# Patient Record
Sex: FEMALE | Race: WHITE | URBAN - METROPOLITAN AREA
[De-identification: names, ages, dates, MRNs, and addresses within clinical notes are randomized per-mention and may not be internally consistent; named-entity substitution may affect disease eponyms.]

---

## 2018-05-03 ENCOUNTER — TRANSFERRED RECORDS (OUTPATIENT)
Dept: HEALTH INFORMATION MANAGEMENT | Facility: CLINIC | Age: 83
End: 2018-05-03

## 2018-05-31 ENCOUNTER — OFFICE VISIT (OUTPATIENT)
Dept: OTOLARYNGOLOGY | Facility: CLINIC | Age: 83
End: 2018-05-31
Payer: MEDICARE

## 2018-05-31 ENCOUNTER — TRANSFERRED RECORDS (OUTPATIENT)
Dept: HEALTH INFORMATION MANAGEMENT | Facility: CLINIC | Age: 83
End: 2018-05-31

## 2018-05-31 ENCOUNTER — TELEPHONE (OUTPATIENT)
Dept: OTOLARYNGOLOGY | Facility: CLINIC | Age: 83
End: 2018-05-31

## 2018-05-31 DIAGNOSIS — M95.0 MOHS DEFECT OF NASAL ALA: Primary | ICD-10-CM

## 2018-05-31 DIAGNOSIS — Z98.890 MOHS DEFECT OF NASAL ALA: Primary | ICD-10-CM

## 2018-05-31 PROCEDURE — 99205 OFFICE O/P NEW HI 60 MIN: CPT | Performed by: OTOLARYNGOLOGY

## 2018-05-31 RX ORDER — ERGOCALCIFEROL 1.25 MG/1
CAPSULE, LIQUID FILLED ORAL
Refills: 0 | COMMUNITY
Start: 2018-05-11

## 2018-05-31 RX ORDER — LOSARTAN POTASSIUM AND HYDROCHLOROTHIAZIDE 25; 100 MG/1; MG/1
TABLET ORAL
Refills: 3 | COMMUNITY
Start: 2018-03-13

## 2018-05-31 RX ORDER — FLUTICASONE PROPIONATE 50 MCG
SPRAY, SUSPENSION (ML) NASAL
Refills: 2 | COMMUNITY
Start: 2018-04-10

## 2018-05-31 RX ORDER — HYDROCODONE BITARTRATE AND ACETAMINOPHEN 5; 325 MG/1; MG/1
TABLET ORAL
Refills: 0 | COMMUNITY
Start: 2018-04-24

## 2018-05-31 RX ORDER — ATENOLOL 50 MG/1
TABLET ORAL
Refills: 3 | COMMUNITY
Start: 2018-04-24

## 2018-05-31 RX ORDER — IBUPROFEN 800 MG/1
TABLET ORAL
Refills: 3 | COMMUNITY
Start: 2018-05-03

## 2018-05-31 RX ORDER — ROPINIROLE 1 MG/1
TABLET, FILM COATED ORAL
Refills: 1 | COMMUNITY
Start: 2018-05-03

## 2018-05-31 RX ORDER — CHOLECALCIFEROL (VITAMIN D3) 25 MCG
TABLET,CHEWABLE ORAL
Refills: 3 | COMMUNITY
Start: 2018-05-11

## 2018-05-31 ASSESSMENT — PAIN SCALES - GENERAL: PAINLEVEL: NO PAIN (0)

## 2018-05-31 NOTE — PROGRESS NOTES
Joel Staton MD    Dear Doctor Shemar,    Thank you for asking me to see your patient, Ms. Isabell Carias, in consultation to evaluate her nasal ala defect after Mohs surgery.  Today I had the pleasure of seeing her at the Facial Plastic and Reconstructive Surgery Clinic in the Department of Otolaryngology at [facility].    CLINICAL SUMMARY:   Diagnoses:   Right nasal alar defect from basal cell carcinoma, s/p Mohs surgery by Dr. Staton.     Comorbidities: CPAP, sleep apnea  Pertinent medications: ASA 81. ibuprofen  Photographs: [facility] consents signed May 31, 2018.   Other: Daughter = Padmini.   Care Checklist:   _Schedule for stage 1 reconstruction with melolabial flap and conchal cartilage alar batten graft under general anesthesia.  _We may need to hold CPAP postoperatively.   _Hold ASA and NSAIDS preoperatively and 7 days postoperatively.       MEDICAL DECISION MAKING:      Nasal alar defect after Mohs surgery. The reconstructive options of healing by secondary intention versus skin grafting versus local flap reconstruction were described in detail.  After carefully considering the options, she wishes to undergo local flap reconstruction because of the decreased healing time and the flap allows installation of an alar batten graft to reinforce her weakened nasal sidewall. She did not find the aesthetic result from healing by secondary intention, nor the time needed to fully heal a wound secondarily to be acceptable. We have initiated surgery scheduling.    It has been a pleasure to participate in the care of Ms. Carias.  Thank you for this kind referral.     Sincerely,    Slava Washburn MD    Division of Facial Plastic and Reconstructive Surgery,   Department of Otolaryngology  [facility]   ____________________________________________________          HISTORY OF PRESENT ILLNESS and SKIN CANCER QUESTIONAAIRE:  As you know,  "Ms. Carias is an83 year old-year-old female who presents with a facial defect after Mohs surgery.     Review of the Mohs or cancer removal documentation shows:   Date of Mohs surgery or skin cancer removal: 5-.  Cancer type: basal cell carcinoma.  Margins: tumor free margins were obtained,  How would you rate your pain since your surgery? 0 (No pain)  Provider- How would you rate your pain since surgery? 0 (No pain)    Have you had any significant bleeding since your surgery? No  Provider- Have you had any significant bleeding since your surgery? No. NO bleeding.     Have you had any significant discharge since your surgery? No  Provider- Have you had any significant discharge since your surgery? No    Have you had any fevers since your surgery? No  Provider- Have you had any fevers since your surgery? No    No: Do you currently smoke?   Provider- Do you currently smoke? No    No: Have you had previous facial reconstruction?   Provider- Have you had previous facial reconstruction? No    What was at the cancer site before the removal? sore  How long had you noticed the lesion or growth prior to having the removal? \"Couple months\"  Did that lesion grow? No  Have you had a lot of sun exposure in the past? Yes    Do you remember blistering sunburns anywhere on your body? Yes  Have you used a tanning bed in the past? No    Have you smoked in the past?No  Have you had radiation to your head or neck in the past? No  Have you had previous skin cancers? No    For a defect site near or on the nose:   No: Did you have troubles breathing through your nose before Mohs surgery or cancer removal?   No: Any problems breathing through your nose after Mohs surgery or cancer removal?    Provider- Any problems breathing through your nose after Mohs surgery or cancer removal?  No. Has long history of rhinorrhea and sinus problems.         REVIEW OF SYSTEMS: a 12 system review was performed by the patient care staff:    Do you " "currently have or have you ever had in the past:    No: Complications with sedation or anesthesia.  Yes -  Use blood thinners. Yes:  ASA 81, ibuprofen  No: Any heart problems.   No: Chest pain.   No: A pacemaker.  Yes - \"I think I bleed easily\": Problems with excessive bleeding or a bleeding disorder.  No: Problems with blood clots or a clotting disorder.   Yes - Both: Sleep apnea or sleep with a CPAP machine.       No: Excessive scarring.   No: Night sweats.   No: Fevers.   No: Double vision.   No: Vision loss.   Yes: Snoring.   No: Difficulty breathing through your nose.   Yes - \"In the morning, I figure it's sinus\": Runny nose.   Yes - \"In the morning, I figure it's sinus\" : Sneezing.   No: Itchy eyes.   No: Itchy throat.   No: Face pain.   No: Face weakness.   No: Face numbness.   No: Difficulty swallowing.   No: Pain with swallowing.,   No: Difficulty hearing or hearing loss.   No: Difficulty urinating.   Yes - \"feels stressed\": Anxiety.   No: Depression.     FAMILY HISTORY:  No: Family history of excessive bleeding or a bleeding disorder.    No: Family history of blood clots or a clotting disorder.    Yes: Family history of skin cancer. \"son had it, but it was from prednisone\"    No family history on file.    PAST MEDICAL HISTORY: No past medical history on file. arthritis, chronic sinusitis, hypertension, depression. Rotator cuff tear.     PAST SURGICAL HISTORY: No past surgical history on file. cholecystectomy, hip replacement, elbow surgery.     SOCIAL HISTORY: No smoking.   Social History   Substance Use Topics     Smoking status: Never Smoker     Smokeless tobacco: Not on file     Alcohol use Not on file       ALLERGIES: Review of patient's allergies indicates not on file. Cipro, unsure of the rest of the medication. Some antibiotics. Can take amoxicillin.     MEDICATIONS:   Current Outpatient Prescriptions   Medication Sig Dispense Refill     atenolol (TENORMIN) 50 MG tablet   3     Cyanocobalamin (B-12) " 1000 MCG LOZG   3     fluticasone (FLONASE) 50 MCG/ACT spray   2     HYDROcodone-acetaminophen (NORCO) 5-325 MG per tablet   0      MG tablet   3     losartan-hydrochlorothiazide (HYZAAR) 100-25 MG per tablet   3     melatonin 5 MG CAPS   3     rOPINIRole (REQUIP) 1 MG tablet   1     sertraline (ZOLOFT) 50 MG tablet   1     vitamin D (ERGOCALCIFEROL) 34404 UNIT capsule   0       Defect size per Mohs record or operative report: 2.0 x 1.2 cm      Patient Care Staff Signature: Dottie Paris CMA (AAMA)    ______________________________________________    Provider- Review of systems, FH, PMH, PSH, SH, ALL, and Medications taken by the patient care staff was reviewed by me: Slava Washburn      Provider- PHYSICAL EXAMINATION:  CONSTITUTIONAL:  No apparent distress.  Pleasant affect.  Normal ability to communicate.  CRANIOFACIAL:  Normocephalic, atraumatic.    SKIN:  [id number] defect.   Subunits involved: right nasal alar wound.   Nearby landmarks: 1mm from nostril margin.   Depth: through the dermis and underlying cartilage. Mucosa intact.  Surrounding skin is viable. No bleeding. The surrounding cheeks has what the patient and family refer to as rosacea and that she has had it for years. These are 2x2mm red areas on both cheeks and do not look like malignant lesions.     EYES:  Extraocular muscles intact.  EARS:  Normal auricles.  Tympanic membranes clear bilaterally.  NOSE: Dynamic right external nasal valve collapse.  Slight septal deviation.  No polyps or purulence.  ORAL CAVITY AND OROPHARYNX: no lesions on inspection.  NECK:  The parotid is soft, without masses.  Supple laryngeal landmarks.  LYMPHATIC:  No palpable lymphadenopathy.  CARDIOVASCULAR:  Carotid pulses are palpable bilaterally.  NEUROLOGIC:  Facial nerve intact.  RESPIRATORY:  Normal respiratory effort.  No stridor.  Voice strong.      Provider-: PREOPERATIVE COUNSELING: An extensive preoperative discussion was held. The patient stated she  understood the risks, benefits, alternatives and limitations of the procedure. The risks including but not limited to bleeding, infection, damage to surrounding structures, numbness, weakness, cancer recurrence, chronic pain, poor aesthetic result, partial or total skin loss, distortion of surrounding facial structures, and unforeseen complications related to surgery or anesthesia were described. I emphasized the risks of partial or total skin loss at the surgical sites. She also understands the possibility of distortion of surrounding facial structures including her nostril margin which may result in alar retraction or nasal obstruction. I discussed the limitations of this procedure in that the donor site will have anticipated scars and complications can occur at the donor site.  She understands that more skin may need to be excised during the reconstruction. We discussed how additional surgeries may be needed to obtain an optimal result and I anticipate the need for a stage 2 surgery and possible stage 3 surgery. We discussed how the patient's obstructive sleep apnea may result in cardiopulmonary complications. I described how no reconstructive effort will be able to restore her to her exact precancer condition. We also acknowledged that facial asymmetries will be present after the reconstruction. The patient stated she had her questions answered to her satisfaction.

## 2018-05-31 NOTE — LETTER
5/31/2018         RE: Isabell Carias  [id number] [facility] [address]        Dear Colleague,    Thank you for referring your patient, Isabell Carias, to the [facility]. Please see a copy of my visit note below.    Joel Staton MD    Dear Doctor Shemar,    Thank you for asking me to see your patient, Ms. Isabell Carias, in consultation to evaluate her nasal ala defect after Mohs surgery.  Today I had the pleasure of seeing her at the Facial Plastic and Reconstructive Surgery Clinic in the Department of Otolaryngology at [facility].    CLINICAL SUMMARY:   Diagnoses:   Right nasal alar defect from basal cell carcinoma, s/p Mohs surgery by Dr. Staton.     Comorbidities: CPAP, sleep apnea  Pertinent medications: ASA 81. ibuprofen  Photographs:  consents signed May 31, 2018.   Other: Daughter = Padmini.   Care Checklist:   _Schedule for stage 1 reconstruction with melolabial flap and conchal cartilage alar batten graft under general anesthesia.  _We may need to hold CPAP postoperatively.   _Hold ASA and NSAIDS preoperatively and 7 days postoperatively.       MEDICAL DECISION MAKING:      Nasal alar defect after Mohs surgery. The reconstructive options of healing by secondary intention versus skin grafting versus local flap reconstruction were described in detail.  After carefully considering the options, she wishes to undergo local flap reconstruction because of the decreased healing time and the flap allows installation of an alar batten graft to reinforce her weakened nasal sidewall. She did not find the aesthetic result from healing by secondary intention, nor the time needed to fully heal a wound secondarily to be acceptable. We have initiated surgery scheduling.    It has been a pleasure to participate in the care of Ms. Carias.  Thank you for this kind referral.     Sincerely,    Slava Washburn MD  Assistant  "Professor  Division of Facial Plastic and Reconstructive Surgery,   Department of Otolaryngology  [facility]   ____________________________________________________          HISTORY OF PRESENT ILLNESS and SKIN CANCER QUESTIONAAIRE:  As you know, Ms. Carias is an83 year old-year-old female who presents with a facial defect after Mohs surgery.     Review of the Mohs or cancer removal documentation shows:   Date of Mohs surgery or skin cancer removal: 5-.  Cancer type: basal cell carcinoma.  Margins: tumor free margins were obtained,  How would you rate your pain since your surgery? 0 (No pain)  Provider- How would you rate your pain since surgery? 0 (No pain)    Have you had any significant bleeding since your surgery? No  Provider- Have you had any significant bleeding since your surgery? No. NO bleeding.     Have you had any significant discharge since your surgery? No  Provider- Have you had any significant discharge since your surgery? No    Have you had any fevers since your surgery? No  Provider- Have you had any fevers since your surgery? No    No: Do you currently smoke?   Provider- Do you currently smoke? No    No: Have you had previous facial reconstruction?   Provider- Have you had previous facial reconstruction? No    What was at the cancer site before the removal? sore  How long had you noticed the lesion or growth prior to having the removal? \"Couple months\"  Did that lesion grow? No  Have you had a lot of sun exposure in the past? Yes    Do you remember blistering sunburns anywhere on your body? Yes  Have you used a tanning bed in the past? No    Have you smoked in the past?No  Have you had radiation to your head or neck in the past? No  Have you had previous skin cancers? No    For a defect site near or on the nose:   No: Did you have troubles breathing through your nose before Mohs surgery or cancer removal?   No: Any problems breathing through your nose after Mohs surgery or cancer " "removal?    Provider- Any problems breathing through your nose after Mohs surgery or cancer removal?  No. Has long history of rhinorrhea and sinus problems.         REVIEW OF SYSTEMS: a 12 system review was performed by the patient care staff:    Do you currently have or have you ever had in the past:    No: Complications with sedation or anesthesia.  Yes -  Use blood thinners. Yes:  ASA 81, ibuprofen  No: Any heart problems.   No: Chest pain.   No: A pacemaker.  Yes - \"I think I bleed easily\": Problems with excessive bleeding or a bleeding disorder.  No: Problems with blood clots or a clotting disorder.   Yes - Both: Sleep apnea or sleep with a CPAP machine.       No: Excessive scarring.   No: Night sweats.   No: Fevers.   No: Double vision.   No: Vision loss.   Yes: Snoring.   No: Difficulty breathing through your nose.   Yes - \"In the morning, I figure it's sinus\": Runny nose.   Yes - \"In the morning, I figure it's sinus\" : Sneezing.   No: Itchy eyes.   No: Itchy throat.   No: Face pain.   No: Face weakness.   No: Face numbness.   No: Difficulty swallowing.   No: Pain with swallowing.,   No: Difficulty hearing or hearing loss.   No: Difficulty urinating.   Yes - \"feels stressed\": Anxiety.   No: Depression.     FAMILY HISTORY:  No: Family history of excessive bleeding or a bleeding disorder.    No: Family history of blood clots or a clotting disorder.    Yes: Family history of skin cancer. \"son had it, but it was from prednisone\"    No family history on file.    PAST MEDICAL HISTORY: No past medical history on file. arthritis, chronic sinusitis, hypertension, depression. Rotator cuff tear.     PAST SURGICAL HISTORY: No past surgical history on file. cholecystectomy, hip replacement, elbow surgery.     SOCIAL HISTORY: No smoking.   Social History   Substance Use Topics     Smoking status: Never Smoker     Smokeless tobacco: Not on file     Alcohol use Not on file       ALLERGIES: Review of patient's allergies " indicates not on file. Cipro, unsure of the rest of the medication. Some antibiotics. Can take amoxicillin.     MEDICATIONS:   Current Outpatient Prescriptions   Medication Sig Dispense Refill     atenolol (TENORMIN) 50 MG tablet   3     Cyanocobalamin (B-12) 1000 MCG LOZG   3     fluticasone (FLONASE) 50 MCG/ACT spray   2     HYDROcodone-acetaminophen (NORCO) 5-325 MG per tablet   0      MG tablet   3     losartan-hydrochlorothiazide (HYZAAR) 100-25 MG per tablet   3     melatonin 5 MG CAPS   3     rOPINIRole (REQUIP) 1 MG tablet   1     sertraline (ZOLOFT) 50 MG tablet   1     vitamin D (ERGOCALCIFEROL) 98076 UNIT capsule   0       Defect size per Mohs record or operative report: 2.0 x 1.2 cm      Patient Care Staff Signature: Dottie Paris CMA (AAMA)    ______________________________________________    Provider- Review of systems, FH, PMH, PSH, SH, ALL, and Medications taken by the patient care staff was reviewed by me: Slava Washburn      Provider- PHYSICAL EXAMINATION:  CONSTITUTIONAL:  No apparent distress.  Pleasant affect.  Normal ability to communicate.  CRANIOFACIAL:  Normocephalic, atraumatic.    SKIN:  [id number] defect.   Subunits involved: right nasal alar wound.   Nearby landmarks: 1mm from nostril margin.   Depth: through the dermis and underlying cartilage. Mucosa intact.  Surrounding skin is viable. No bleeding. The surrounding cheeks has what the patient and family refer to as rosacea and that she has had it for years. These are 2x2mm red areas on both cheeks and do not look like malignant lesions.     EYES:  Extraocular muscles intact.  EARS:  Normal auricles.  Tympanic membranes clear bilaterally.  NOSE: Dynamic right external nasal valve collapse.  Slight septal deviation.  No polyps or purulence.  ORAL CAVITY AND OROPHARYNX: no lesions on inspection.  NECK:  The parotid is soft, without masses.  Supple laryngeal landmarks.  LYMPHATIC:  No palpable lymphadenopathy.  CARDIOVASCULAR:   Carotid pulses are palpable bilaterally.  NEUROLOGIC:  Facial nerve intact.  RESPIRATORY:  Normal respiratory effort.  No stridor.  Voice strong.      Provider-: PREOPERATIVE COUNSELING: An extensive preoperative discussion was held. The patient stated she understood the risks, benefits, alternatives and limitations of the procedure. The risks including but not limited to bleeding, infection, damage to surrounding structures, numbness, weakness, cancer recurrence, chronic pain, poor aesthetic result, partial or total skin loss, distortion of surrounding facial structures, and unforeseen complications related to surgery or anesthesia were described. I emphasized the risks of partial or total skin loss at the surgical sites. She also understands the possibility of distortion of surrounding facial structures including her nostril margin which may result in alar retraction or nasal obstruction. I discussed the limitations of this procedure in that the donor site will have anticipated scars and complications can occur at the donor site.  She understands that more skin may need to be excised during the reconstruction. We discussed how additional surgeries may be needed to obtain an optimal result and I anticipate the need for a stage 2 surgery and possible stage 3 surgery. We discussed how the patient's obstructive sleep apnea may result in cardiopulmonary complications. I described how no reconstructive effort will be able to restore her to her exact precancer condition. We also acknowledged that facial asymmetries will be present after the reconstruction. The patient stated she had her questions answered to her satisfaction.      Again, thank you for allowing me to participate in the care of your patient.        Sincerely,        Slava Washburn MD

## 2018-05-31 NOTE — MR AVS SNAPSHOT
After Visit Summary   5/31/2018    Isabell Carias    MRN: [id number]           Patient Information     Date Of Birth          1935        Visit Information        Provider Department      5/31/2018 2:30 PM Slava Washburn MD [facility]        Today's Diagnoses     Mohs defect of nasal ala    -  1       Follow-ups after your visit        Who to contact     If you have questions or need follow up information about today's clinic visit or your schedule please contact [facility] directly at [phone].  Normal or non-critical lab and imaging results will be communicated to you by MyChart, letter or phone within 4 business days after the clinic has received the results. If you do not hear from us within 7 days, please contact the clinic through MyChart or phone. If you have a critical or abnormal lab result, we will notify you by phone as soon as possible.  Submit refill requests through [employer] or call your pharmacy and they will forward the refill request to us. Please allow 3 business days for your refill to be completed.          Additional Information About Your Visit        Care EveryWhere ID     This is your Care EveryWhere ID. This could be used by other organizations to access your [facility] medical records  [id number]         Blood Pressure from Last 3 Encounters:   No data found for BP    Weight from Last 3 Encounters:   No data found for Wt              Today, you had the following     No orders found for display      Information about OPIOIDS     PRESCRIPTION OPIOIDS: WHAT YOU NEED TO KNOW   You have a prescription for an opioid (narcotic) pain medicine. Opioids can cause addiction. If you have a history of chemical dependency of any type, you are at a higher risk of becoming addicted to opioids. Only take this medicine after all other options have been tried. Take it for as short a time and as few doses as possible.     Do not:    Drive. If you  drive while taking these medicines, you could be arrested for driving under the influence (DUI).    Operate heavy machinery    Do any other dangerous activities while taking these medicines.     Drink any alcohol while taking these medicines.      Take with any other medicines that contain acetaminophen. Read all labels carefully. Look for the word  acetaminophen  or  Tylenol.  Ask your pharmacist if you have questions or are unsure.    Store your pills in a secure place, locked if possible. We will not replace any lost or stolen medicine. If you don t finish your medicine, please throw away (dispose) as directed by your pharmacist. The [place] Pollution Control Agency has more information about safe disposal: https://www.pca.[facility].[place]/living-green/managing-unwanted-medications    All opioids tend to cause constipation. Drink plenty of water and eat foods that have a lot of fiber, such as fruits, vegetables, prune juice, apple juice and high-fiber cereal. Take a laxative (Miralax, milk of magnesia, Colace, Senna) if you don t move your bowels at least every other day.          Primary Care Provider Office Phone # Fax #    Yara Araujo [phone]       [facility] [address] [place] [address]        Equal Access to Services     ALECIA RUDOLPH AH: Luigi Rodriguez, wazanda margie, qaybta kaalmada olga, willis spence. So [facility] [phone].    ATENCIÓN: Si habla español, tiene a mace disposición servicios gratuitos de asistencia lingüística. Liz al [phone].    We comply with applicable federal civil rights laws and [place] laws. We do not discriminate on the basis of race, color, national origin, age, disability, sex, sexual orientation, or gender identity.            Thank you!     Thank you for choosing [facility]  for your care. Our goal is always to provide you with excellent care. Hearing back from  our patients is one way we can continue to improve our services. Please take a few minutes to complete the written survey that you may receive in the mail after your visit with us. Thank you!             Your Updated Medication List - Protect others around you: Learn how to safely use, store and throw away your medicines at www.disposemymeds.org.          This list is accurate as of 5/31/18  3:53 PM.  Always use your most recent med list.                   Brand Name Dispense Instructions for use Diagnosis    atenolol 50 MG tablet    TENORMIN          B-12 1000 MCG Lozg           fluticasone 50 MCG/ACT spray    FLONASE          HYDROcodone-acetaminophen 5-325 MG per tablet    NORCO           MG tablet   Generic drug:  ibuprofen           losartan-hydrochlorothiazide 100-25 MG per tablet    HYZAAR          melatonin 5 MG Caps           rOPINIRole 1 MG tablet    REQUIP          sertraline 50 MG tablet    ZOLOFT          vitamin D 24824 UNIT capsule    ERGOCALCIFEROL

## 2018-06-01 NOTE — TELEPHONE ENCOUNTER
Date Scheduled: 6-12-18  Facility: [facility]  Surgeon: Dr. Washburn   Post-op appointment scheduled:   Next 5 appointments (look out 90 days)     Jun 21, 2018  2:45 PM CDT   Return Visit with Slava Washburn MD   [facility]    [address] [place] [id number] [phone]                   scheduled?: No  Surgery packet/instructions confirmed received?  Yes  Special Considerations:   Mary Salmeron, Surgery Scheduling Coordinator

## 2018-06-01 NOTE — TELEPHONE ENCOUNTER
Procedure: right Stage 1 right Nasal  reconstruction with local flaps, complex closure, ear cartilage graft and wound preperation  Facility: [facility] or [facility]  Length: 3.5 hour(s)  Surgeon:Slava Washburn Jr, MD  Anesthesia: General  Diagnosis: Mohs Defect  Out Patient or AM admit:  (Same day)  BMI:There is no height or weight on file to calculate BMI. (If over 43 for general or 45 for MAC cannot be scheduled at [facility])   Pre-op Appointments needed: History & Physical within 30 days of surgery  Post-op appointments needed: Mohs Defect 1 week   needed:No   Surgery packet/instructions given to patient?:  Yes  Pre op teaching done:  Yes  Lens Orders Needed: No   Referring provider:   Special Considerations: